# Patient Record
Sex: FEMALE | Race: BLACK OR AFRICAN AMERICAN | NOT HISPANIC OR LATINO | URBAN - METROPOLITAN AREA
[De-identification: names, ages, dates, MRNs, and addresses within clinical notes are randomized per-mention and may not be internally consistent; named-entity substitution may affect disease eponyms.]

---

## 2018-10-25 ENCOUNTER — EMERGENCY (EMERGENCY)
Facility: HOSPITAL | Age: 43
LOS: 0 days | Discharge: HOME | End: 2018-10-25
Admitting: PHYSICIAN ASSISTANT

## 2018-10-25 VITALS
SYSTOLIC BLOOD PRESSURE: 126 MMHG | TEMPERATURE: 97 F | RESPIRATION RATE: 18 BRPM | DIASTOLIC BLOOD PRESSURE: 71 MMHG | OXYGEN SATURATION: 99 % | HEART RATE: 77 BPM

## 2018-10-25 DIAGNOSIS — Z98.890 OTHER SPECIFIED POSTPROCEDURAL STATES: Chronic | ICD-10-CM

## 2018-10-25 DIAGNOSIS — M79.89 OTHER SPECIFIED SOFT TISSUE DISORDERS: ICD-10-CM

## 2018-10-25 DIAGNOSIS — Z90.49 ACQUIRED ABSENCE OF OTHER SPECIFIED PARTS OF DIGESTIVE TRACT: ICD-10-CM

## 2018-10-25 DIAGNOSIS — Z90.49 ACQUIRED ABSENCE OF OTHER SPECIFIED PARTS OF DIGESTIVE TRACT: Chronic | ICD-10-CM

## 2018-10-25 DIAGNOSIS — M79.671 PAIN IN RIGHT FOOT: ICD-10-CM

## 2018-10-25 DIAGNOSIS — Z98.890 OTHER SPECIFIED POSTPROCEDURAL STATES: ICD-10-CM

## 2018-10-25 DIAGNOSIS — Z88.0 ALLERGY STATUS TO PENICILLIN: ICD-10-CM

## 2018-10-25 NOTE — ED PROVIDER NOTE - OBJECTIVE STATEMENT
44 yo female with 44 yo female with no significant PMH presents to the ED c/o right sided heel pain that radiates up right lower extremity x 1 day. Patient states when she stood up in the morning after getting out of bed she noticed pain to pain to the bottom of her right heel that is worse with walking and baring weight.  At rest, patient describes the pain as a throbbing pain.  Patient denies any traumatic injury, fall, or stepping on anything.  Patient denies fever, chills, SOB, chest pain, puncture wounds, or back pain.

## 2018-10-25 NOTE — ED PROVIDER NOTE - PHYSICAL EXAMINATION
GENERAL: Well-nourished, Well-developed. NAD.  CVS: No reproducible chest wall tenderness. Normal S1,S2. No murmurs appreciated on auscultation   RESP: Chest rise symmetrical with good expansion. Lungs clear to auscultation B/L. No wheezing, rales, or rhonchi auscultated.  MSK: + swelling to right heel of foot.  + TTP to medial aspect of right heel and to the bottom of heel. No visible puncture wounds or bug bites. FROM of upper and lower extremities B/L. No visible signs of trauma, ecchymosis, or erythema  Skin: Warm, Dry. No rashes or lesions   EXT: Radial and pedal pulses present B/L. No calf tenderness or swelling B/L. No pedal edema B/L.  Neuro: AA&O x 3.  Sensation grossly intact. Strength 5/5 B/L. + Antalgic gait  Psych: Appropriate mood and affect

## 2018-10-25 NOTE — ED PROVIDER NOTE - NS ED ROS FT
"""On Restasis BID OU. Lubrication.  FSO 1 gm BID"" Constitutional: (-) fever (-) malaise (-) diaphoresis (-) chills   Cardiovascular: (-) chest pain, (-) syncope  Respiratory: (-) shortness of breath  Gastrointestinal: (-) N/V/D   Musculoskeletal: (+) right sided heel pain (-) neck pain (-) back pain  Integumentary: (-) rash  Neurological: (-) headache, (-) dizziness

## 2018-10-25 NOTE — ED PROVIDER NOTE - NSFOLLOWUPCLINICS_GEN_ALL_ED_FT
[facility]  Podiatry  .  [place]   Phone: [phone]  Fax:   Follow Up Time: 1-3 Days

## 2018-10-25 NOTE — ED PROVIDER NOTE - CARE PROVIDER_API CALL
Hermann Bell (DPM), Podiatric Medicine and Surgery  [address] [place] [id number]  Phone: [phone]  Fax: [phone]

## 2018-10-25 NOTE — ED PROCEDURE NOTE - CPROC ED POST PROC CARE GUIDE1
Verbal/written post procedure instructions were given to patient/caregiver./Elevate the injured extremity as instructed./Instructed patient/caregiver regarding signs and symptoms of infection./Instructed patient/caregiver to follow-up with primary care physician.

## 2019-04-01 ENCOUNTER — FORM ENCOUNTER (OUTPATIENT)
Age: 44
End: 2019-04-01

## 2019-05-15 ENCOUNTER — FORM ENCOUNTER (OUTPATIENT)
Age: 44
End: 2019-05-15

## 2019-05-19 ENCOUNTER — EMERGENCY (EMERGENCY)
Facility: HOSPITAL | Age: 44
LOS: 0 days | Discharge: HOME | End: 2019-05-19
Attending: EMERGENCY MEDICINE | Admitting: EMERGENCY MEDICINE
Payer: COMMERCIAL

## 2019-05-19 VITALS
DIASTOLIC BLOOD PRESSURE: 78 MMHG | SYSTOLIC BLOOD PRESSURE: 129 MMHG | RESPIRATION RATE: 18 BRPM | HEART RATE: 82 BPM | TEMPERATURE: 98 F | OXYGEN SATURATION: 99 %

## 2019-05-19 VITALS — WEIGHT: 156.09 LBS

## 2019-05-19 DIAGNOSIS — Z98.890 OTHER SPECIFIED POSTPROCEDURAL STATES: Chronic | ICD-10-CM

## 2019-05-19 DIAGNOSIS — R05 COUGH: ICD-10-CM

## 2019-05-19 DIAGNOSIS — Z90.49 ACQUIRED ABSENCE OF OTHER SPECIFIED PARTS OF DIGESTIVE TRACT: Chronic | ICD-10-CM

## 2019-05-19 PROCEDURE — 71046 X-RAY EXAM CHEST 2 VIEWS: CPT | Mod: 26

## 2019-05-19 PROCEDURE — 99283 EMERGENCY DEPT VISIT LOW MDM: CPT

## 2019-05-19 NOTE — ED PROVIDER NOTE - PROGRESS NOTE DETAILS
Chaperone ()  exam: normal external genitalia, Weiner in place no leakage around Weiner. Chaperone Nurse Jovan  exam: normal external genitalia, Weiner in place no leakage around Weiner.

## 2019-05-19 NOTE — ED PROVIDER NOTE - CARE PLAN
Principal Discharge DX:	Cough  Assessment and plan of treatment:	Plan: XR to rule out pneumonia. declines bladder US.

## 2019-05-19 NOTE — ED ADULT NURSE NOTE - OBJECTIVE STATEMENT
Patient is a 42yo female c/o coughing up yellow thick mucus and upper back pain when coughing after her hysterectomy on 05/16. Patient also noticed blood particles in her clements leg bag which was placed on 05/17 after her hysterectomy for urinary retention. (-) fever, abd pain, N/V. Surgery site clean and intact.

## 2019-05-19 NOTE — ED PROVIDER NOTE - OBJECTIVE STATEMENT
Attending Note: 44 y/o F PMH hypothyroidism, endometriosis s/p partial hysterectomy x 4 days p/w cough x2 days that illicits upper thoracic pain. Non productive. No pain at rest. No [place] or exertional angina. no calf pain or leg swelling. No SOB. No fevers or chills. Cough is non-productive. +congestion. No vaginal discharge or bleeding. Pt states that she has been experiencing some pain x1 day at the site of Weiner. Abd pain decreased since surgery. no nausea, vomiting, diarrhea, constipation.

## 2019-05-19 NOTE — ED PROVIDER NOTE - CLINICAL SUMMARY MEDICAL DECISION MAKING FREE TEXT BOX
Attending Note: 44 y/o F PMH hypothyroidism, endometriosis s/p partial hysterectomy x 4 days p/w upper thoracic pain with cough x2 days. Np pain at rest. No SOB. No fevers. No vaginal discharge or bleeding. Pt states that she has been experiencing some pain x1 day at the site of Weiner. PE: Midline incision intact, no discharge; appropriately tender; no CVAT. Plan: XR to rule out pneumonia. reaffirmed pt has no pain unless coughing. Pt ambulating without distress. Has OBGYN fu tomorrow. Patient was given strict return and follow up precautions. The patient has been informed of all concerning signs and symptoms to return to Emergency Department, the necessity to follow up with PMD/Clinic/follow up provided within 2-3 days was explained, and the patient reports understanding of above with capacity and insight.

## 2019-05-19 NOTE — ED ADULT TRIAGE NOTE - CHIEF COMPLAINT QUOTE
"I had a partial hysterectomy on Thursday.  Since yesterday Socorro been coughing up yellow thick mucus and have upper back pain when coughing.  I also have a Weiner leg bag and noticed blood particles inside"

## 2019-05-19 NOTE — ED PROVIDER NOTE - NSFOLLOWUPINSTRUCTIONS_ED_ALL_ED_FT
Follow up with your PMD within 48-72 hrs. Show copies of your reports given to you. Take all of your medications as previously prescribed. Return for any fever, worsening pain, shortness of breath, rash, new or worsening symptoms or any other concern.     Cough    Coughing is a reflex that clears your throat and your airways. Coughing helps to heal and protect your lungs. It is normal to cough occasionally, but a cough that happens with other symptoms or lasts a long time may be a sign of a condition that needs treatment. Coughing may be caused by infections, asthma or COPD, smoking, postnasal drip, gastroesophageal reflux, as well as other medical conditions. Take medicines only as instructed by your health care provider. Avoid environments or triggers that causes you to cough at work or at home.    SEEK IMMEDIATE MEDICAL CARE IF YOU HAVE ANY OF THE FOLLOWING SYMPTOMS: coughing up blood, shortness of breath, rapid heart rate, chest pain, unexplained weight loss or night sweats.

## 2019-05-19 NOTE — ED PROVIDER NOTE - PHYSICAL EXAMINATION
VITAL SIGNS: noted  CONSTITUTIONAL: Well-developed; well-nourished; in no acute distress  HEAD: Normocephalic; atraumatic  EYES: conjunctiva and sclera clear  ENT: No nasal discharge; airway clear. MMM  NECK: Supple; non tender. No anterior cervical lymphadenopathy noted  CARD: Regular rate and rhythm  RESP: CTAB/L, no wheezes, rales or rhonchi  ABD: Normal bowel sounds; soft; non-distended; Midline incision intact, no discharge; appropriately tender; no CVAT.   EXT: Normal ROM. No calf tenderness or edema. Distal pulses intact  NEURO: Alert, oriented. Grossly unremarkable. No focal deficits  SKIN: Skin exam is warm and dry, no acute rash  MS: No midline spinal tenderness

## 2019-05-19 NOTE — ED ADULT NURSE NOTE - CHPI ED NUR SYMPTOMS NEG
no chills/no decreased eating/drinking/no weakness/no vomiting/no fever/no tingling/no nausea/no dizziness

## 2020-07-22 NOTE — ED PROVIDER NOTE - NS ED NOTE AC HIGH RISK COUNTRIES
Chief Complaint   Patient presents with     Blood Draw     labs drawn via venipuncture by RN in lab     BP (!) 143/60 (BP Location: Left arm, Patient Position: Chair, Cuff Size: Adult Large)   Pulse 84   Temp 97.9  F (36.6  C) (Oral)   Resp 18   Wt 99.2 kg (218 lb 12.8 oz)   SpO2 96%   BMI 29.67 kg/m      Labs collected and sent from left antecubital venipuncture in lab by RN. Pt tolerated well.   Pt checked in for next appointment.    Candy Clark RN     No

## 2021-05-08 ENCOUNTER — INPATIENT (INPATIENT)
Facility: HOSPITAL | Age: 46
LOS: 0 days | Discharge: HOME | End: 2021-05-09
Attending: STUDENT IN AN ORGANIZED HEALTH CARE EDUCATION/TRAINING PROGRAM | Admitting: STUDENT IN AN ORGANIZED HEALTH CARE EDUCATION/TRAINING PROGRAM
Payer: MEDICAID

## 2021-05-08 VITALS
WEIGHT: 166.89 LBS | RESPIRATION RATE: 16 BRPM | TEMPERATURE: 98 F | SYSTOLIC BLOOD PRESSURE: 137 MMHG | DIASTOLIC BLOOD PRESSURE: 80 MMHG | OXYGEN SATURATION: 98 % | HEART RATE: 98 BPM

## 2021-05-08 DIAGNOSIS — Z90.49 ACQUIRED ABSENCE OF OTHER SPECIFIED PARTS OF DIGESTIVE TRACT: Chronic | ICD-10-CM

## 2021-05-08 DIAGNOSIS — Z98.890 OTHER SPECIFIED POSTPROCEDURAL STATES: Chronic | ICD-10-CM

## 2021-05-08 LAB
ALBUMIN SERPL ELPH-MCNC: 4 G/DL — SIGNIFICANT CHANGE UP (ref 3.5–5.2)
ALP SERPL-CCNC: 55 U/L — SIGNIFICANT CHANGE UP (ref 30–115)
ALT FLD-CCNC: 17 U/L — SIGNIFICANT CHANGE UP (ref 0–41)
ANION GAP SERPL CALC-SCNC: 13 MMOL/L — SIGNIFICANT CHANGE UP (ref 7–14)
APTT BLD: 23 SEC — CRITICAL LOW (ref 27–39.2)
AST SERPL-CCNC: 33 U/L — SIGNIFICANT CHANGE UP (ref 0–41)
BASOPHILS # BLD AUTO: 0.04 K/UL — SIGNIFICANT CHANGE UP (ref 0–0.2)
BASOPHILS NFR BLD AUTO: 0.4 % — SIGNIFICANT CHANGE UP (ref 0–1)
BILIRUB SERPL-MCNC: 0.3 MG/DL — SIGNIFICANT CHANGE UP (ref 0.2–1.2)
BLD GP AB SCN SERPL QL: SIGNIFICANT CHANGE UP
BUN SERPL-MCNC: 12 MG/DL — SIGNIFICANT CHANGE UP (ref 10–20)
CALCIUM SERPL-MCNC: 9.2 MG/DL — SIGNIFICANT CHANGE UP (ref 8.5–10.1)
CHLORIDE SERPL-SCNC: 102 MMOL/L — SIGNIFICANT CHANGE UP (ref 98–110)
CHOLEST SERPL-MCNC: 177 MG/DL — SIGNIFICANT CHANGE UP
CO2 SERPL-SCNC: 23 MMOL/L — SIGNIFICANT CHANGE UP (ref 17–32)
CREAT SERPL-MCNC: 0.8 MG/DL — SIGNIFICANT CHANGE UP (ref 0.7–1.5)
EOSINOPHIL # BLD AUTO: 0.07 K/UL — SIGNIFICANT CHANGE UP (ref 0–0.7)
EOSINOPHIL NFR BLD AUTO: 0.6 % — SIGNIFICANT CHANGE UP (ref 0–8)
GLUCOSE SERPL-MCNC: 100 MG/DL — HIGH (ref 70–99)
HCT VFR BLD CALC: 39.2 % — SIGNIFICANT CHANGE UP (ref 37–47)
HDLC SERPL-MCNC: 37 MG/DL — LOW
HGB BLD-MCNC: 13.1 G/DL — SIGNIFICANT CHANGE UP (ref 12–16)
IMM GRANULOCYTES NFR BLD AUTO: 0.4 % — HIGH (ref 0.1–0.3)
INR BLD: 0.96 RATIO — SIGNIFICANT CHANGE UP (ref 0.65–1.3)
LIPID PNL WITH DIRECT LDL SERPL: 125 MG/DL — HIGH
LYMPHOCYTES # BLD AUTO: 2.5 K/UL — SIGNIFICANT CHANGE UP (ref 1.2–3.4)
LYMPHOCYTES # BLD AUTO: 23 % — SIGNIFICANT CHANGE UP (ref 20.5–51.1)
MCHC RBC-ENTMCNC: 29.1 PG — SIGNIFICANT CHANGE UP (ref 27–31)
MCHC RBC-ENTMCNC: 33.4 G/DL — SIGNIFICANT CHANGE UP (ref 32–37)
MCV RBC AUTO: 87.1 FL — SIGNIFICANT CHANGE UP (ref 81–99)
MONOCYTES # BLD AUTO: 0.88 K/UL — HIGH (ref 0.1–0.6)
MONOCYTES NFR BLD AUTO: 8.1 % — SIGNIFICANT CHANGE UP (ref 1.7–9.3)
NEUTROPHILS # BLD AUTO: 7.34 K/UL — HIGH (ref 1.4–6.5)
NEUTROPHILS NFR BLD AUTO: 67.5 % — SIGNIFICANT CHANGE UP (ref 42.2–75.2)
NON HDL CHOLESTEROL: 140 MG/DL — HIGH
NRBC # BLD: 0 /100 WBCS — SIGNIFICANT CHANGE UP (ref 0–0)
PLATELET # BLD AUTO: 291 K/UL — SIGNIFICANT CHANGE UP (ref 130–400)
POTASSIUM SERPL-MCNC: 4.8 MMOL/L — SIGNIFICANT CHANGE UP (ref 3.5–5)
POTASSIUM SERPL-SCNC: 4.8 MMOL/L — SIGNIFICANT CHANGE UP (ref 3.5–5)
PROT SERPL-MCNC: 7.8 G/DL — SIGNIFICANT CHANGE UP (ref 6–8)
PROTHROM AB SERPL-ACNC: 11 SEC — SIGNIFICANT CHANGE UP (ref 9.95–12.87)
RBC # BLD: 4.5 M/UL — SIGNIFICANT CHANGE UP (ref 4.2–5.4)
RBC # FLD: 12.6 % — SIGNIFICANT CHANGE UP (ref 11.5–14.5)
SARS-COV-2 RNA SPEC QL NAA+PROBE: SIGNIFICANT CHANGE UP
SODIUM SERPL-SCNC: 138 MMOL/L — SIGNIFICANT CHANGE UP (ref 135–146)
TRIGL SERPL-MCNC: 149 MG/DL — SIGNIFICANT CHANGE UP
TROPONIN T SERPL-MCNC: <0.01 NG/ML — SIGNIFICANT CHANGE UP
TROPONIN T SERPL-MCNC: <0.01 NG/ML — SIGNIFICANT CHANGE UP
WBC # BLD: 10.87 K/UL — HIGH (ref 4.8–10.8)
WBC # FLD AUTO: 10.87 K/UL — HIGH (ref 4.8–10.8)

## 2021-05-08 PROCEDURE — 99223 1ST HOSP IP/OBS HIGH 75: CPT

## 2021-05-08 PROCEDURE — 99285 EMERGENCY DEPT VISIT HI MDM: CPT

## 2021-05-08 PROCEDURE — 70496 CT ANGIOGRAPHY HEAD: CPT | Mod: 26,MA

## 2021-05-08 PROCEDURE — 70450 CT HEAD/BRAIN W/O DYE: CPT | Mod: 26,MA,59

## 2021-05-08 PROCEDURE — 93010 ELECTROCARDIOGRAM REPORT: CPT

## 2021-05-08 PROCEDURE — 71045 X-RAY EXAM CHEST 1 VIEW: CPT | Mod: 26

## 2021-05-08 PROCEDURE — 70498 CT ANGIOGRAPHY NECK: CPT | Mod: 26,MA

## 2021-05-08 PROCEDURE — 99221 1ST HOSP IP/OBS SF/LOW 40: CPT

## 2021-05-08 PROCEDURE — 0042T: CPT

## 2021-05-08 RX ORDER — ENOXAPARIN SODIUM 100 MG/ML
40 INJECTION SUBCUTANEOUS DAILY
Refills: 0 | Status: DISCONTINUED | OUTPATIENT
Start: 2021-05-08 | End: 2021-05-09

## 2021-05-08 RX ORDER — ASPIRIN/CALCIUM CARB/MAGNESIUM 324 MG
324 TABLET ORAL ONCE
Refills: 0 | Status: COMPLETED | OUTPATIENT
Start: 2021-05-08 | End: 2021-05-08

## 2021-05-08 RX ORDER — HEPARIN SODIUM 5000 [USP'U]/ML
5000 INJECTION INTRAVENOUS; SUBCUTANEOUS EVERY 8 HOURS
Refills: 0 | Status: DISCONTINUED | OUTPATIENT
Start: 2021-05-08 | End: 2021-05-08

## 2021-05-08 RX ADMIN — Medication 324 MILLIGRAM(S): at 04:59

## 2021-05-08 NOTE — H&P ADULT - NSICDXPASTSURGICALHX_GEN_ALL_CORE_FT
PAST SURGICAL HISTORY:  H/O carpal tunnel repair b/l    History of appendectomy     History of hysteroscopy

## 2021-05-08 NOTE — H&P ADULT - NSICDXFAMILYHX_GEN_ALL_CORE_FT
FAMILY HISTORY:  Father  Still living? Unknown  FH: hypertension, Age at diagnosis: Age Unknown    Mother  Still living? Unknown  FH: hypertension, Age at diagnosis: Age Unknown  FH: thyroid disease, Age at diagnosis: Age Unknown

## 2021-05-08 NOTE — ED ADULT NURSE NOTE - CHIEF COMPLAINT QUOTE
pt BIBA for R sided numbness x 40 mins ago. pt stated "just the R side of my face is numb" Stroke code activated, MD made aware

## 2021-05-08 NOTE — ED ADULT TRIAGE NOTE - CHIEF COMPLAINT QUOTE
pt BIBA for R sided numbness x 40 mins ago. pt stated "just the R side of my face is numb" pt BIBA for R sided numbness x 40 mins ago. pt stated "just the R side of my face is numb" Stroke code activated, MD made aware

## 2021-05-08 NOTE — ED PROVIDER NOTE - PROGRESS NOTE DETAILS
[facility] Tele Stroke Dr Qureshi cancelled stroke code, patient is not TPA candidate Dr Qureshi recommend admission to tele, no stroke unit or ICU

## 2021-05-08 NOTE — H&P ADULT - ATTENDING COMMENTS
pt still has facial numbness  cont tele  fu mri     #Progress Note Handoff  Pending (specify):  MRI, neuro fu , tele 24hr  Family discussion: dw pt, fully aaox3 understands and agrees with plan   Disposition: Home_

## 2021-05-08 NOTE — H&P ADULT - ASSESSMENT
45 year old F with PMHx of Graves disease (off medications due to remission) presenting with facial numbness.            45 year old F with PMHx of Graves disease (off medications due to remission) presenting with facial numbness.           -N/c mri brain  -ECHO   -lipid profile and hbaic, tsh    #) Graves Disease  -off medications  -follows with Dr. Jarrett  -CT with diffusely enlarged thyroid gland, will obtain thyroid ultrasound.     DVT ppx:  Heparin     GI ppx: Not indicated    Diet: Regular     Activity: Increase as tolerated    CHG WASH    Dispo:  Tele  45 year old F with PMHx of Graves disease (off medications due to remission) presenting with facial numbness.    #) Right facial numbness/hyposthesia   -Symptoms transient, have since resolved   -DDx: [place] Palsy, MS, TIA, r/o CVA  -Check MRI brain nc  -Check ECHO   -Lipid profile and HbA1c  -Neuro follow up     #) Graves Disease  -off medications, currently with sinus tachycardia on EKG   -follows with Dr. Jarrett  -CT with diffusely enlarged thyroid gland, will obtain thyroid ultrasound   -Check TSH, FT4, T3    DVT ppx:  Heparin     GI ppx: Not indicated    Diet: Regular     Activity: Increase as tolerated    CHG WASH    Dispo:  Tele for now

## 2021-05-08 NOTE — H&P ADULT - NSHPPHYSICALEXAM_GEN_ALL_CORE
GENERAL: NAD, well-developed  PSYCH: AAOx3  HEENT:  Atraumatic, Normocephalic. EOMI, PERRLA, conjunctiva clear, sclera white, No JVD  PULMONARY: Clear to auscultation bilaterally; No wheeze  CARDIOVASCULAR: Regular rate and rhythm; No murmurs, rubs, or gallops  GASTROINTESTINAL: Soft, Nontender, Nondistended; Bowel sounds present  MUSCULOSKELETAL:  2+ Peripheral Pulses, No clubbing, cyanosis, or edema  NEUROLOGY: non-focal  SKIN: No rashes or lesions

## 2021-05-08 NOTE — CONSULT NOTE ADULT - ASSESSMENT
Impression:  44 yo right handed female with pmhx of graves disease not on medication presented after she woke up with right facial numbness. Patient states that she went to sleep at around 11pm and then woke up around 12mn with facial sensory symptoms. Patient denies focal weakness headache. CTH negative for acute findings. Patient not a tpa candidate currently since her symptoms are mild sensory deficit on the right face. Ed team discussed with ctc stroke attending     Relevant PMH:  -Follow up pending official read of CTAH/N  -N/c mri brain   -lipid profile and hbaic, tsh  -Echo      Disposition: TIA OBS Unit     Impression:  44 yo right handed female with pmhx of graves disease not on medication presented after she woke up with right facial numbness. Patient states that she went to sleep at around 11pm and then woke up around 12mn with facial sensory symptoms. Patient denies focal weakness headache. CTH negative for acute findings. Patient not a tpa candidate currently since her symptoms are mild sensory deficit on the right face. CTA H/N /P was negative for LVO and perfusion deficits. Ed team discussed with ctc stroke attending     Relevant PMH:  -N/c mri brain  -ECHO   -lipid profile and hbaic, tsh    Disposition: telemetry

## 2021-05-08 NOTE — H&P ADULT - NSHPSOCIALHISTORY_GEN_ALL_CORE
Marital Status:  (  x )    (   ) Single    (   )    (  )   Lives with: (  ) alone  (x  ) children   ( x ) spouse   (  ) parents  (  ) other  Recent Travel: No recent travel  Occupation:  LNP at [employer]     Substance Use (street drugs): ( x ) never used  (  ) other:  Tobacco Usage:  ( x  ) never smoked   (   ) former smoker   (   ) current smoker  (     ) pack year  Alcohol Usage: None

## 2021-05-08 NOTE — H&P ADULT - HISTORY OF PRESENT ILLNESS
45 year old F with PMHx of Graves disease (off medications due to remission) presenting with facial numbness.  Patient states that she went to sleep at around 11pm and then woke up around midnight with facial sensory symptoms. She states she felt a heaviness and a decrease in sensation on the entire right side of the face. Patient denies any weakness or headache, but did not look in the mirror to assess deficits. In the ED stroke cod was called, NIHSS:1.  CTH negative for acute findings. Patient was not a tpa candidate as since her symptoms are mild sensory deficit on the right face. CTA H/N /P was negative for LVO and perfusion deficits. She was admitted to telemetry for monitoring.     T(C): 36.7 (05-08-21 @ 06:41), Max: 36.9 (05-08-21 @ 00:46)  HR: 95 (05-08-21 @ 06:41) (95 - 100)  BP: 113/69 (05-08-21 @ 06:41) (113/69 - 137/80)  RR: 18 (05-08-21 @ 06:41) (16 - 18)  SpO2: 97% (05-08-21 @ 05:01) (97% - 98%)

## 2021-05-08 NOTE — H&P ADULT - NSHPLABSRESULTS_GEN_ALL_CORE
05-08    138  |  102  |  12  ----------------------------<  100<H>  4.8   |  23  |  0.8    Ca    9.2      08 May 2021 01:06    TPro  7.8  /  Alb  4.0  /  TBili  0.3  /  DBili  x   /  AST  33  /  ALT  17  /  AlkPhos  55  05-08          PT/INR - ( 08 May 2021 01:06 )   PT: 11.00 sec;   INR: 0.96 ratio         PTT - ( 08 May 2021 01:06 )  PTT:23.0 sec    Lactate Trend      CARDIAC MARKERS ( 08 May 2021 01:06 )  x     / <0.01 ng/mL / x     / x     / x        CAPILLARY BLOOD GLUCOSE  100 (08 May 2021 03:15)      EXAM:  CT PERFUSION W MAPS IC        EXAM:  CT ANGIO NECK (W)AW IC        EXAM:  CT ANGIO BRAIN (W)AW IC            PROCEDURE DATE:  05/08/2021        IMPRESSION:    1. No evidence of focal perfusion deficit to suggest acute cerebral ischemia.    2. No CTA evidence of major vascular stenoses or occlusions.    3. Thyroid gland appears diffusely enlarged, correlation with thyroid ultrasound is recommended.      XR CHEST PORTABLE URGENT 1V            PROCEDURE DATE:  05/08/2021        Impression:    No radiographic evidence of acute cardiopulmonary disease. 05-08    138  |  102  |  12  ----------------------------<  100<H>  4.8   |  23  |  0.8    Ca    9.2      08 May 2021 01:06    TPro  7.8  /  Alb  4.0  /  TBili  0.3  /  DBili  x   /  AST  33  /  ALT  17  /  AlkPhos  55  05-08          PT/INR - ( 08 May 2021 01:06 )   PT: 11.00 sec;   INR: 0.96 ratio         PTT - ( 08 May 2021 01:06 )  PTT:23.0 sec      CARDIAC MARKERS ( 08 May 2021 01:06 )  x     / <0.01 ng/mL / x     / x     / x        CAPILLARY BLOOD GLUCOSE  100 (08 May 2021 03:15)      EXAM:  CT PERFUSION W MAPS IC        EXAM:  CT ANGIO NECK (W)AW IC        EXAM:  CT ANGIO BRAIN (W)AW IC            PROCEDURE DATE:  05/08/2021        IMPRESSION:    1. No evidence of focal perfusion deficit to suggest acute cerebral ischemia.    2. No CTA evidence of major vascular stenoses or occlusions.    3. Thyroid gland appears diffusely enlarged, correlation with thyroid ultrasound is recommended.    XR CHEST PORTABLE URGENT 1V          PROCEDURE DATE:  05/08/2021      Impression:    No radiographic evidence of acute cardiopulmonary disease.

## 2021-05-08 NOTE — CONSULT NOTE ADULT - SUBJECTIVE AND OBJECTIVE BOX
Chief Complaint: Right facial numbness    HPI:  46 yo right handed female with pmhx of graves disease not on medication presented after she woke up with right facial numbness. Patient states that she went to sleep at around 11pm and then woke up around 12mn with facial sensory symptoms. Patient denies focal weakness headache     Relevant PMH:  [] Prior ischemic stroke/TIA  [] Afib  []CAD  []HTN  []DLD  []DM []PVD []Obesity [] Sedentary lifestyle []CHF  []MIREILLE  []Cancer Hx     Social History: [] Smoking []  Drug Use: []   Alcohol Use:   [] Other:  denies        Possible Location of Stroke:  Unknown at this time, will have a better understanding post stroke workup.        Possible Cause of Stroke:  Unknown at this time, will have a better understanding post stroke workup.          Relevant Cerebral Imaging:  < from: CT Brain Stroke Protocol (05.08.21 @ 01:20) >  FINDINGS:    VENTRICULAR SYSTEM: Age-appropriate.    BRAIN PARENCHYMA: Unremarkable.    ASPECT SCORE: 10.    INTRACRANIAL HEMORRHAGE: None.    MASS EFFECT/MIDLINE SHIFT: None.    PARANASAL SINUSES AND MASTOID AIR CELLS: Unremarkable.    OSSEOUS STRUCTURES: Unremarkable.      IMPRESSION:    No mass effect or intracranial hemorrhage. No CT evidence of acute large territorial infarction.      Dr. Getachew Swartz discussedpreliminary findings with PURVI WETZEL NP [id number] on 5/8/2021 1:24 AM with readback.    GETACHEW SWARTZ M.D., RESIDENT RADIOLOGIST  This document has been electronically signed.  CHARLENE LORENZO MD; Attending Radiologist  This document has been electronically signed. May  8 2021  1:56AM    < end of copied text >    Relevant Cervicocerebral Imaging:          Relevant blood tests:  COVID-19 PCR (05.08.21 @ 01:06)   COVID-19 PCR: NotDetec: You can help in the fight against COVID-19. [facility] may contact     Relevant cardiac rhythm monitoring: Pending      Relevant Cardiac Structure:(TTE/ANANDA +/-):[]No intracardiac thrombus/[] no vegetation/[]no akynesia/EF: Pending    Home Medications:  Not on any routine medications at home      MEDICATIONS  (STANDING):          Exam:  patient a/o x3   cn: Intact except for right facial sensory loss in the [id number] v2 division  power: 5/5 throughout/ no drift  FTN: No dsmetria  HKS: No limb ataxia  Gait: deferred  No neglect    Vital Signs Last 24 Hrs  T(C): 36.9 (08 May 2021 00:46), Max: 36.9 (08 May 2021 00:46)  T(F): 98.4 (08 May 2021 00:46), Max: 98.4 (08 May 2021 00:46)  HR: 98 (08 May 2021 00:46) (98 - 98)  BP: 137/80 (08 May 2021 00:46) (137/80 - 137/80)  BP(mean): --  RR: 16 (08 May 2021 00:46) (16 - 16)  SpO2: 98% (08 May 2021 00:46) (98% - 98%)      NIHSS  LOC:       1a: 0    1b(Questions):  0         1c(Instructions):  0           Best Gaze:0  Visual:0  Motor:                 RUE:  0   RLE:  0   LUE: 0    LLE: 0    FACE:  0   Limb Ataxia: 0  Sensory:     1  Language:    0   Dysarthria:   0       Extinction and Inattention: 0    NIHSS on admission: 1                        m-RS: (0 at baseline and current)  0 No symptoms at all  1 No significant disability despite symptoms; able to carry out all usual duties and activities without assistance  2 Slight disability; unable to carry out all previous activities, but able to look after own affairs  3 Moderate disability; requiring some help, but able to walk without assistance  4 Moderately severe disability; unable to walk without assistance and unable to attend to own bodily needs without assistance  5 Severe disability; bedridden, incontinent and requiring constant nursing care and attention  6 Dead   Chief Complaint: Right facial numbness    HPI:  44 yo right handed female with pmhx of graves disease not on medication presented after she woke up with right facial numbness. Patient states that she went to sleep at around 11pm and then woke up around 12mn with facial sensory symptoms. Patient denies focal weakness headache     Relevant PMH:  [] Prior ischemic stroke/TIA  [] Afib  []CAD  []HTN  []DLD  []DM []PVD []Obesity [] Sedentary lifestyle []CHF  []MIREILLE  []Cancer Hx     Social History: [] Smoking []  Drug Use: []   Alcohol Use:   [] Other:  denies        Possible Location of Stroke:  Unknown at this time, will have a better understanding post stroke workup.        Possible Cause of Stroke:  Unknown at this time, will have a better understanding post stroke workup.          Relevant Cerebral Imaging:  < from: CT Brain Stroke Protocol (05.08.21 @ 01:20) >  FINDINGS:    VENTRICULAR SYSTEM: Age-appropriate.    BRAIN PARENCHYMA: Unremarkable.    ASPECT SCORE: 10.    INTRACRANIAL HEMORRHAGE: None.    MASS EFFECT/MIDLINE SHIFT: None.    PARANASAL SINUSES AND MASTOID AIR CELLS: Unremarkable.    OSSEOUS STRUCTURES: Unremarkable.      IMPRESSION:    No mass effect or intracranial hemorrhage. No CT evidence of acute large territorial infarction.      Dr. Getachew Swartz discussedpreliminary findings with PURVI WETZEL NP [id number] on 5/8/2021 1:24 AM with readback.    GETACHEW SWARTZ M.D., RESIDENT RADIOLOGIST  This document has been electronically signed.  CHARLENE LORENZO MD; Attending Radiologist  This document has been electronically signed. May  8 2021  1:56AM    < end of copied text >    Relevant Cervicocerebral Imaging:  < from: CT Angio Neck w/ IV Cont (05.08.21 @ 01:41) >  FINDINGS:    CT PERFUSION:    There is no evidence of focal perfusion deficit to suggest acute cerebral ischemia.    CTA NECK:    Normal origins of the innominate, left common carotid and left subclavian artery off the aortic arch.    Normal contrast filling the bilateral common carotid arteries with normal carotid bifurcations.    CTA BRAIN:    Normal contrast filling of the middle cerebral arteries and distal MCA branches.    Normal contrast filling of the bilateral anterior cerebral arteries.    Normal contrast filling of the basilar artery, bilateralSCA and PCA vessels.    Normal contrast filling of the bilateral vertebral arteries with co dominance.      IMPRESSION:    No evidence of focal perfusion deficit to suggest acute cerebral ischemia.    No CTA evidence of major vascular stenoses or occlusions.          ******PRELIMINARY REPORT******    ******PRELIMINARY REPORT******          GETACHEW SWARTZ M.D., RESIDENT RADIOLOGIST    < end of copied text >          Relevant blood tests:  COVID-19 PCR (05.08.21 @ 01:06)   COVID-19 PCR: NotDete: You can help in the fight against COVID-19. [facility] may contact     Relevant cardiac rhythm monitoring: Pending      Relevant Cardiac Structure:(TTE/ANANDA +/-):[]No intracardiac thrombus/[] no vegetation/[]no akynesia/EF: Pending    Home Medications:  Not on any routine medications at home      MEDICATIONS  (STANDING):          Exam:  patient a/o x3   cn: Intact except for right facial sensory loss in the [id number] v2 division  power: 5/5 throughout/ no drift  FTN: No dsmetria  HKS: No limb ataxia  Gait: deferred  No neglect    Vital Signs Last 24 Hrs  T(C): 36.9 (08 May 2021 00:46), Max: 36.9 (08 May 2021 00:46)  T(F): 98.4 (08 May 2021 00:46), Max: 98.4 (08 May 2021 00:46)  HR: 98 (08 May 2021 00:46) (98 - 98)  BP: 137/80 (08 May 2021 00:46) (137/80 - 137/80)  BP(mean): --  RR: 16 (08 May 2021 00:46) (16 - 16)  SpO2: 98% (08 May 2021 00:46) (98% - 98%)      NIHSS  LOC:       1a: 0    1b(Questions):  0         1c(Instructions):  0           Best Gaze:0  Visual:0  Motor:                 RUE:  0   RLE:  0   LUE: 0    LLE: 0    FACE:  0   Limb Ataxia: 0  Sensory:     1  Language:    0   Dysarthria:   0       Extinction and Inattention: 0    NIHSS on admission: 1                        m-RS: (0 at baseline and current)  0 No symptoms at all  1 No significant disability despite symptoms; able to carry out all usual duties and activities without assistance  2 Slight disability; unable to carry out all previous activities, but able to look after own affairs  3 Moderate disability; requiring some help, but able to walk without assistance  4 Moderately severe disability; unable to walk without assistance and unable to attend to own bodily needs without assistance  5 Severe disability; bedridden, incontinent and requiring constant nursing care and attention  6 Dead

## 2021-05-08 NOTE — CONSULT NOTE ADULT - ATTENDING COMMENTS
I have personally seen and examined this patient on 5/8. I have fully participated in the care of this patient.  I have reviewed all pertinent clinical information, including history, physical exam, plan and note. Patient presented with right face and right shoulder numbness after sleeping to the left side. Exam today shows normal strength. Still has reduced sensation in the right side of face. Less likely CVA. Awaiting for Alex MRI.   I have reviewed all pertinent clinical information and reviewed all relevant imaging and diagnostic studies personally.  Recommendations as above.  Agree with above assessment except as noted.

## 2021-05-08 NOTE — ED PROVIDER NOTE - OBJECTIVE STATEMENT
44 yo female hx of hyperthyroidism (no med needed now) present c/o right sided facial numbness started 30 min PTA. Patient woke up with the sensation to her face and she went to bed around 11pm. also reported tingling sensation down her right arm. Denies HA/Dizziness/slur speech/extremities weakness and numbness/ facial weakness. Denies Fever/chill/recent illness/coughing/chest pain/sob/abd pain/n/v/d/extremities pain/urinary sxs. Denies SI/HI/Hallucinations

## 2021-05-08 NOTE — SWALLOW BEDSIDE ASSESSMENT ADULT - SLP PERTINENT HISTORY OF CURRENT PROBLEM
44 yo female admitted to ED with c/o right facial numbness. PMHx of graves disease not on medication. CTH negative for acute findings. Patient not a tpa candidate since symptoms are mild sensory deficit on the right face. CTA H/N /P negative for LVO and perfusion deficits. Pt. is awaiting for MRI head study.

## 2021-05-08 NOTE — ED PROVIDER NOTE - PHYSICAL EXAMINATION
CONSTITUTIONAL: Well-appearing; well-nourished; in no apparent distress.   EYES: PERRL; EOM intact.   ENT: normal nose; no rhinorrhea; normal pharynx with no tonsillar hypertrophy.   NECK: Supple; non-tender; no cervical lymphadenopathy. No JVD.   CARDIOVASCULAR: Normal S1, S2; no murmurs, rubs, or gallops.   RESPIRATORY: Normal chest excursion with respiration; breath sounds clear and equal bilaterally; no wheezes, rhonchi, or rales.  GI/: Normal bowel sounds; non-distended; non-tender; no palpable organomegaly.   MS: No evidence of trauma or deformity. Non-tender to palpation. No scoliosis. No CVA tenderness. Normal ROM in all four extremities; non-tender to palpation; distal pulses are normal.   SKIN: Normal for age and race; warm; dry; good turgor; no apparent lesions or exudate.   NEURO/PSYCH: A & O x 4; CN II- IV and VI-XII grossly unremarkable. subjective decreased sensation of right face. no drifting. strength equal to b/l upper and lower extremities. speaking coherently. nml cerebellum test. ambulate with nml and steady gait NIHSS - 1

## 2021-05-08 NOTE — ED PROVIDER NOTE - CLINICAL SUMMARY MEDICAL DECISION MAKING FREE TEXT BOX
pt evaluated for RUE paresthesias and facial numbness, stroke code called in ED. pt evaluated by neuro who recommended admission for further workup and pt agreed. pt admitted to telemetry.

## 2021-05-09 ENCOUNTER — TRANSCRIPTION ENCOUNTER (OUTPATIENT)
Age: 46
End: 2021-05-09

## 2021-05-09 VITALS
TEMPERATURE: 98 F | HEART RATE: 86 BPM | RESPIRATION RATE: 19 BRPM | DIASTOLIC BLOOD PRESSURE: 77 MMHG | SYSTOLIC BLOOD PRESSURE: 116 MMHG

## 2021-05-09 LAB
ALBUMIN SERPL ELPH-MCNC: 3.8 G/DL — SIGNIFICANT CHANGE UP (ref 3.5–5.2)
ALP SERPL-CCNC: 59 U/L — SIGNIFICANT CHANGE UP (ref 30–115)
ALT FLD-CCNC: 13 U/L — SIGNIFICANT CHANGE UP (ref 0–41)
ANION GAP SERPL CALC-SCNC: 11 MMOL/L — SIGNIFICANT CHANGE UP (ref 7–14)
APTT BLD: 30.1 SEC — SIGNIFICANT CHANGE UP (ref 27–39.2)
AST SERPL-CCNC: 15 U/L — SIGNIFICANT CHANGE UP (ref 0–41)
BASOPHILS # BLD AUTO: 0.04 K/UL — SIGNIFICANT CHANGE UP (ref 0–0.2)
BASOPHILS NFR BLD AUTO: 0.5 % — SIGNIFICANT CHANGE UP (ref 0–1)
BILIRUB SERPL-MCNC: 0.5 MG/DL — SIGNIFICANT CHANGE UP (ref 0.2–1.2)
BUN SERPL-MCNC: 14 MG/DL — SIGNIFICANT CHANGE UP (ref 10–20)
CALCIUM SERPL-MCNC: 9.2 MG/DL — SIGNIFICANT CHANGE UP (ref 8.5–10.1)
CHLORIDE SERPL-SCNC: 106 MMOL/L — SIGNIFICANT CHANGE UP (ref 98–110)
CHOLEST SERPL-MCNC: 176 MG/DL — SIGNIFICANT CHANGE UP
CO2 SERPL-SCNC: 24 MMOL/L — SIGNIFICANT CHANGE UP (ref 17–32)
CREAT SERPL-MCNC: 0.7 MG/DL — SIGNIFICANT CHANGE UP (ref 0.7–1.5)
EOSINOPHIL # BLD AUTO: 0.22 K/UL — SIGNIFICANT CHANGE UP (ref 0–0.7)
EOSINOPHIL NFR BLD AUTO: 2.8 % — SIGNIFICANT CHANGE UP (ref 0–8)
GLUCOSE SERPL-MCNC: 91 MG/DL — SIGNIFICANT CHANGE UP (ref 70–99)
HCT VFR BLD CALC: 38.5 % — SIGNIFICANT CHANGE UP (ref 37–47)
HDLC SERPL-MCNC: 37 MG/DL — LOW
HGB BLD-MCNC: 12.7 G/DL — SIGNIFICANT CHANGE UP (ref 12–16)
IMM GRANULOCYTES NFR BLD AUTO: 0.4 % — HIGH (ref 0.1–0.3)
INR BLD: 1.01 RATIO — SIGNIFICANT CHANGE UP (ref 0.65–1.3)
LIPID PNL WITH DIRECT LDL SERPL: 126 MG/DL — HIGH
LYMPHOCYTES # BLD AUTO: 2.79 K/UL — SIGNIFICANT CHANGE UP (ref 1.2–3.4)
LYMPHOCYTES # BLD AUTO: 35.1 % — SIGNIFICANT CHANGE UP (ref 20.5–51.1)
MAGNESIUM SERPL-MCNC: 1.8 MG/DL — SIGNIFICANT CHANGE UP (ref 1.8–2.4)
MCHC RBC-ENTMCNC: 28.6 PG — SIGNIFICANT CHANGE UP (ref 27–31)
MCHC RBC-ENTMCNC: 33 G/DL — SIGNIFICANT CHANGE UP (ref 32–37)
MCV RBC AUTO: 86.7 FL — SIGNIFICANT CHANGE UP (ref 81–99)
MONOCYTES # BLD AUTO: 0.88 K/UL — HIGH (ref 0.1–0.6)
MONOCYTES NFR BLD AUTO: 11.1 % — HIGH (ref 1.7–9.3)
NEUTROPHILS # BLD AUTO: 3.98 K/UL — SIGNIFICANT CHANGE UP (ref 1.4–6.5)
NEUTROPHILS NFR BLD AUTO: 50.1 % — SIGNIFICANT CHANGE UP (ref 42.2–75.2)
NON HDL CHOLESTEROL: 139 MG/DL — HIGH
NRBC # BLD: 0 /100 WBCS — SIGNIFICANT CHANGE UP (ref 0–0)
PHOSPHATE SERPL-MCNC: 4 MG/DL — SIGNIFICANT CHANGE UP (ref 2.1–4.9)
PLATELET # BLD AUTO: 261 K/UL — SIGNIFICANT CHANGE UP (ref 130–400)
POTASSIUM SERPL-MCNC: 4.1 MMOL/L — SIGNIFICANT CHANGE UP (ref 3.5–5)
POTASSIUM SERPL-SCNC: 4.1 MMOL/L — SIGNIFICANT CHANGE UP (ref 3.5–5)
PROT SERPL-MCNC: 6.7 G/DL — SIGNIFICANT CHANGE UP (ref 6–8)
PROTHROM AB SERPL-ACNC: 11.6 SEC — SIGNIFICANT CHANGE UP (ref 9.95–12.87)
RBC # BLD: 4.44 M/UL — SIGNIFICANT CHANGE UP (ref 4.2–5.4)
RBC # FLD: 12.8 % — SIGNIFICANT CHANGE UP (ref 11.5–14.5)
SODIUM SERPL-SCNC: 141 MMOL/L — SIGNIFICANT CHANGE UP (ref 135–146)
T3 SERPL-MCNC: 114 NG/DL — SIGNIFICANT CHANGE UP (ref 80–200)
T4 AB SER-ACNC: 9 UG/DL — SIGNIFICANT CHANGE UP (ref 4.6–12)
TRIGL SERPL-MCNC: 124 MG/DL — SIGNIFICANT CHANGE UP
TSH SERPL-MCNC: 0.58 UIU/ML — SIGNIFICANT CHANGE UP (ref 0.27–4.2)
WBC # BLD: 7.94 K/UL — SIGNIFICANT CHANGE UP (ref 4.8–10.8)
WBC # FLD AUTO: 7.94 K/UL — SIGNIFICANT CHANGE UP (ref 4.8–10.8)

## 2021-05-09 PROCEDURE — 99238 HOSP IP/OBS DSCHRG MGMT 30/<: CPT

## 2021-05-09 PROCEDURE — 76536 US EXAM OF HEAD AND NECK: CPT | Mod: 26

## 2021-05-09 PROCEDURE — 70551 MRI BRAIN STEM W/O DYE: CPT | Mod: 26

## 2021-05-09 PROCEDURE — 93306 TTE W/DOPPLER COMPLETE: CPT | Mod: 26

## 2021-05-09 NOTE — DISCHARGE NOTE PROVIDER - NSDCCPCAREPLAN_GEN_ALL_CORE_FT
PRINCIPAL DISCHARGE DIAGNOSIS  Diagnosis: Facial numbness  Assessment and Plan of Treatment: ruled out neurologic issues

## 2021-05-09 NOTE — DISCHARGE NOTE PROVIDER - HOSPITAL COURSE
HPI:  45 year old F with PMHx of Graves disease (off medications due to remission) presenting with facial numbness.  Patient states that she went to sleep at around 11pm and then woke up around midnight with facial sensory symptoms. She states she felt a heaviness and a decrease in sensation on the entire right side of the face. Patient denies any weakness or headache, but did not look in the mirror to assess deficits. In the ED stroke cod was called, NIHSS:1.  CTH negative for acute findings. Patient was not a tpa candidate as since her symptoms are mild sensory deficit on the right face. CTA H/N /P was negative for LVO and perfusion deficits. She was admitted to telemetry for monitoring.     T(C): 36.7 (05-08-21 @ 06:41), Max: 36.9 (05-08-21 @ 00:46)  HR: 95 (05-08-21 @ 06:41) (95 - 100)  BP: 113/69 (05-08-21 @ 06:41) (113/69 - 137/80)  RR: 18 (05-08-21 @ 06:41) (16 - 18)  SpO2: 97% (05-08-21 @ 05:01) (97% - 98%)     (08 May 2021 09:32)    < from: TTE Echo Complete w/o Contrast w/ Doppler (05.09.21 @ 09:53) >    1. Normal global left ventricular systolic function.   2. LV Ejection Fraction by Garcia's Method with a biplane EF of 58 %.   3. Normal left ventricular internal cavity size.   4. The mean global longitudinal peak strain by speckle tracking is -14.8% which is reduced.   5. Normal left atrial size.   6. Normal right atrial size.   7. No evidence of mitral valve regurgitation.   8. LA volume Index is 17.0 ml/m² ml/m2.    < end of copied text >

## 2021-05-09 NOTE — DISCHARGE NOTE NURSING/CASE MANAGEMENT/SOCIAL WORK - PATIENT PORTAL LINK FT
You can access the FollowMyHealth Patient Portal offered by [facility] by registering at the following website: [email or web address]. By joining [employer]’s FollowMyHealth portal, you will also be able to view your health information using other applications (apps) compatible with our system.

## 2021-05-09 NOTE — PROGRESS NOTE ADULT - ASSESSMENT
HPI:  45 year old F with PMHx of Graves disease (off medications due to remission) presenting with facial numbness.  Patient states that she went to sleep at around 11pm and then woke up around midnight with facial sensory symptoms. She states she felt a heaviness and a decrease in sensation on the entire right side of the face. Patient denies any weakness or headache, but did not look in the mirror to assess deficits. In the ED stroke cod was called, NIHSS:1.  CTH negative for acute findings. Patient was not a tpa candidate as since her symptoms are mild sensory deficit on the right face. CTA H/N /P was negative for LVO and perfusion deficits. She was admitted to telemetry for monitoring.     #Facial numbness rule out neurologic etiology (CVA?)  resolved  MRI performed awaiting report -> spoke to logistics to expedite     #Graves disease in remission    #Overweight BMI 28 patient needs to see dieitian outpatient for further evaluation     PROGRESS NOTE HANDOFF    Pending: MRI report , if  neg dc home     Family discussion: patient verbalized understanding and agreeable to plan of care     Disposition:  home

## 2021-05-09 NOTE — PROGRESS NOTE ADULT - SUBJECTIVE AND OBJECTIVE BOX
JOSE R PERKINS  45y  [id number]      Patient is a 45y old  Female who presents with a chief complaint of Loss of sensation (08 May 2021 09:32)      INTERVAL HPI/OVERNIGHT EVENTS:    no acute overnight     REVIEW OF SYSTEMS:  CONSTITUTIONAL: No fever, weight loss, or fatigue  EYES: No eye pain, visual disturbances, or discharge  ENMT:  No difficulty hearing, tinnitus, vertigo; No sinus or throat pain  NECK: No pain or stiffness  BREASTS: No pain, masses, or nipple discharge  RESPIRATORY: No cough, wheezing, chills or hemoptysis; No shortness of breath  CARDIOVASCULAR: No chest pain, palpitations, dizziness, or leg swelling  GASTROINTESTINAL: No abdominal or epigastric pain. No nausea, vomiting, or hematemesis; No diarrhea or constipation. No melena or hematochezia.  GENITOURINARY: No dysuria, frequency, hematuria, or incontinence  NEUROLOGICAL: No headaches, memory loss, loss of strength, numbness, or tremors  SKIN: No itching, burning, rashes, or lesions   LYMPH NODES: No enlarged glands  ENDOCRINE: No heat or cold intolerance; No hair loss  MUSCULOSKELETAL: No joint pain or swelling; No muscle, back, or extremity pain  PSYCHIATRIC: No depression, anxiety, mood swings, or difficulty sleeping  HEME/LYMPH: No easy bruising, or bleeding gums  ALLERY AND IMMUNOLOGIC: No hives or eczema  FAMILY HISTORY:  FH: thyroid disease (Mother)    FH: hypertension (Mother, Father)      T(C): 36.4 (05-09-21 @ 12:40), Max: 37.1 (05-08-21 @ 21:00)  HR: 86 (05-09-21 @ 12:40) (80 - 90)  BP: 116/77 (05-09-21 @ 12:40) (91/52 - 133/63)  RR: 19 (05-09-21 @ 12:40) (18 - 19)  SpO2: 99% (05-08-21 @ 20:39) (99% - 99%)  Wt(kg): --Vital Signs Last 24 Hrs  T(C): 36.4 (09 May 2021 12:40), Max: 37.1 (08 May 2021 21:00)  T(F): 97.5 (09 May 2021 12:40), Max: 98.8 (08 May 2021 21:00)  HR: 86 (09 May 2021 12:40) (80 - 90)  BP: 116/77 (09 May 2021 12:40) (91/52 - 133/63)  BP(mean): --  RR: 19 (09 May 2021 12:40) (18 - 19)  SpO2: 99% (08 May 2021 20:39) (99% - 99%)    PHYSICAL EXAM:  GENERAL: NAD, well-groomed, well-developed  HEAD:  Atraumatic, Normocephalic  EYES: EOMI, PERRLA, conjunctiva and sclera clear  ENMT: No tonsillar erythema, exudates, or enlargement; Moist mucous membranes, Good dentition, No lesions  NECK: Supple, No JVD, Normal thyroid  NERVOUS SYSTEM:  Alert & Oriented X3, Good concentration; Motor Strength 5/5 B/L upper and lower extremities; DTRs 2+ intact and symmetric  PULM: Clear to auscultation bilaterally  CARDIAC: Regular rate and rhythm; No murmurs, rubs, or gallops  GI: Soft, Nontender, Nondistended; Bowel sounds present  EXTREMITIES:  2+ Peripheral Pulses, No clubbing, cyanosis, or edema  LYMPH: No lymphadenopathy noted  SKIN: No rashes or lesions    Consultant(s) Notes Reviewed:  [x ] YES  [ ] NO  Care Discussed with Consultants/Other Providers [ x] YES  [ ] NO    LABS:                            12.7   7.94  )-----------( 261      ( 09 May 2021 04:30 )             38.5   05-09    141  |  106  |  14  ----------------------------<  91  4.1   |  24  |  0.7    Ca    9.2      09 May 2021 04:30  Phos  4.0     05-09  Mg     1.8     05-09    TPro  6.7  /  Alb  3.8  /  TBili  0.5  /  DBili  x   /  AST  15  /  ALT  13  /  AlkPhos  59  05-09            enoxaparin Injectable 40 milliGRAM(s) SubCutaneous daily      HEALTH ISSUES - PROBLEM Dx:          Case Discussed with House Staff   Spectra [phone]

## 2021-05-09 NOTE — CHART NOTE - NSCHARTNOTEFT_GEN_A_CORE
This is a late note for a patient who was discharged earlier today. I saw the patient in the morning.    <<<RESIDENT DISCHARGE NOTE>>>     JOSE R PERKINS  MRN-[id number]    VITAL SIGNS:  T(F): 97.5 (05-09-21 @ 12:40), Max: 97.5 (05-09-21 @ 12:40)  HR: 86 (05-09-21 @ 12:40)  BP: 116/77 (05-09-21 @ 12:40)  SpO2: --      PHYSICAL EXAMINATION:  GEN: NAD, Resting comfortably in bed  PULM: Clear to auscultation bilaterally, No wheezes  CVS: Regular rate and rhythm, S1-S2, no murmurs  ABD: Soft, non-tender, non-distended, no guarding  EXT: No edema  NEURO: AAOx3, no focal deficits    TEST RESULTS:                        12.7   7.94  )-----------( 261      ( 09 May 2021 04:30 )             38.5       05-09    141  |  106  |  14  ----------------------------<  91  4.1   |  24  |  0.7    Ca    9.2      09 May 2021 04:30  Phos  4.0     05-09  Mg     1.8     05-09    TPro  6.7  /  Alb  3.8  /  TBili  0.5  /  DBili  x   /  AST  15  /  ALT  13  /  AlkPhos  59  05-09      FINAL DISCHARGE INTERVIEW:  Resident(s) Present: Dr. Chun Ceja    DISCHARGE MEDICATION RECONCILIATION  reviewed with Attending (Name: ) Dr. Jacobo    DISPOSITION:   [ x ] Home,    [  ] Home with Visiting Nursing Services,   [  ]  SNF/ NH,    [  ] Acute Rehab (4A),   [  ] Other (Specify:_________)

## 2021-05-10 LAB
A1C WITH ESTIMATED AVERAGE GLUCOSE RESULT: 5.2 % — SIGNIFICANT CHANGE UP (ref 4–5.6)
COVID-19 SPIKE DOMAIN AB INTERP: POSITIVE
COVID-19 SPIKE DOMAIN ANTIBODY RESULT: >250 U/ML — HIGH
ESTIMATED AVERAGE GLUCOSE: 103 MG/DL — SIGNIFICANT CHANGE UP (ref 68–114)
SARS-COV-2 IGG+IGM SERPL QL IA: >250 U/ML — HIGH
SARS-COV-2 IGG+IGM SERPL QL IA: POSITIVE

## 2021-05-18 DIAGNOSIS — E05.00 THYROTOXICOSIS WITH DIFFUSE GOITER WITHOUT THYROTOXIC CRISIS OR STORM: ICD-10-CM

## 2021-05-18 DIAGNOSIS — R20.0 ANESTHESIA OF SKIN: ICD-10-CM

## 2021-05-18 DIAGNOSIS — Z88.0 ALLERGY STATUS TO PENICILLIN: ICD-10-CM

## 2021-05-18 DIAGNOSIS — E66.3 OVERWEIGHT: ICD-10-CM

## 2021-06-17 PROBLEM — E05.90 THYROTOXICOSIS, UNSPECIFIED WITHOUT THYROTOXIC CRISIS OR STORM: Chronic | Status: ACTIVE | Noted: 2021-05-08

## 2021-07-20 ENCOUNTER — TRANSCRIPTION ENCOUNTER (OUTPATIENT)
Age: 46
End: 2021-07-20

## 2021-07-20 ENCOUNTER — EMERGENCY (EMERGENCY)
Facility: HOSPITAL | Age: 46
LOS: 0 days | Discharge: HOME | End: 2021-07-20
Attending: EMERGENCY MEDICINE | Admitting: EMERGENCY MEDICINE
Payer: MEDICAID

## 2021-07-20 VITALS — HEART RATE: 98 BPM | DIASTOLIC BLOOD PRESSURE: 62 MMHG | SYSTOLIC BLOOD PRESSURE: 145 MMHG

## 2021-07-20 VITALS
DIASTOLIC BLOOD PRESSURE: 80 MMHG | TEMPERATURE: 98 F | WEIGHT: 164.91 LBS | RESPIRATION RATE: 18 BRPM | HEART RATE: 93 BPM | OXYGEN SATURATION: 99 % | SYSTOLIC BLOOD PRESSURE: 158 MMHG | HEIGHT: 64 IN

## 2021-07-20 DIAGNOSIS — Z88.0 ALLERGY STATUS TO PENICILLIN: ICD-10-CM

## 2021-07-20 DIAGNOSIS — R07.89 OTHER CHEST PAIN: ICD-10-CM

## 2021-07-20 DIAGNOSIS — R06.02 SHORTNESS OF BREATH: ICD-10-CM

## 2021-07-20 DIAGNOSIS — Z98.890 OTHER SPECIFIED POSTPROCEDURAL STATES: Chronic | ICD-10-CM

## 2021-07-20 DIAGNOSIS — Z90.49 ACQUIRED ABSENCE OF OTHER SPECIFIED PARTS OF DIGESTIVE TRACT: Chronic | ICD-10-CM

## 2021-07-20 LAB
ALBUMIN SERPL ELPH-MCNC: 4.1 G/DL — SIGNIFICANT CHANGE UP (ref 3.5–5.2)
ALP SERPL-CCNC: 58 U/L — SIGNIFICANT CHANGE UP (ref 30–115)
ALT FLD-CCNC: 10 U/L — SIGNIFICANT CHANGE UP (ref 0–41)
ANION GAP SERPL CALC-SCNC: 12 MMOL/L — SIGNIFICANT CHANGE UP (ref 7–14)
AST SERPL-CCNC: 22 U/L — SIGNIFICANT CHANGE UP (ref 0–41)
BASOPHILS # BLD AUTO: 0.05 K/UL — SIGNIFICANT CHANGE UP (ref 0–0.2)
BASOPHILS NFR BLD AUTO: 0.7 % — SIGNIFICANT CHANGE UP (ref 0–1)
BILIRUB SERPL-MCNC: 1 MG/DL — SIGNIFICANT CHANGE UP (ref 0.2–1.2)
BUN SERPL-MCNC: 10 MG/DL — SIGNIFICANT CHANGE UP (ref 10–20)
CALCIUM SERPL-MCNC: 9 MG/DL — SIGNIFICANT CHANGE UP (ref 8.5–10.1)
CHLORIDE SERPL-SCNC: 101 MMOL/L — SIGNIFICANT CHANGE UP (ref 98–110)
CO2 SERPL-SCNC: 23 MMOL/L — SIGNIFICANT CHANGE UP (ref 17–32)
CREAT SERPL-MCNC: 0.7 MG/DL — SIGNIFICANT CHANGE UP (ref 0.7–1.5)
EOSINOPHIL # BLD AUTO: 0.09 K/UL — SIGNIFICANT CHANGE UP (ref 0–0.7)
EOSINOPHIL NFR BLD AUTO: 1.3 % — SIGNIFICANT CHANGE UP (ref 0–8)
GLUCOSE SERPL-MCNC: 86 MG/DL — SIGNIFICANT CHANGE UP (ref 70–99)
HCG SERPL QL: NEGATIVE — SIGNIFICANT CHANGE UP
HCT VFR BLD CALC: 39.4 % — SIGNIFICANT CHANGE UP (ref 37–47)
HGB BLD-MCNC: 13.1 G/DL — SIGNIFICANT CHANGE UP (ref 12–16)
IMM GRANULOCYTES NFR BLD AUTO: 0.4 % — HIGH (ref 0.1–0.3)
LYMPHOCYTES # BLD AUTO: 2.28 K/UL — SIGNIFICANT CHANGE UP (ref 1.2–3.4)
LYMPHOCYTES # BLD AUTO: 33.8 % — SIGNIFICANT CHANGE UP (ref 20.5–51.1)
MCHC RBC-ENTMCNC: 29 PG — SIGNIFICANT CHANGE UP (ref 27–31)
MCHC RBC-ENTMCNC: 33.2 G/DL — SIGNIFICANT CHANGE UP (ref 32–37)
MCV RBC AUTO: 87.2 FL — SIGNIFICANT CHANGE UP (ref 81–99)
MONOCYTES # BLD AUTO: 0.66 K/UL — HIGH (ref 0.1–0.6)
MONOCYTES NFR BLD AUTO: 9.8 % — HIGH (ref 1.7–9.3)
NEUTROPHILS # BLD AUTO: 3.63 K/UL — SIGNIFICANT CHANGE UP (ref 1.4–6.5)
NEUTROPHILS NFR BLD AUTO: 54 % — SIGNIFICANT CHANGE UP (ref 42.2–75.2)
NRBC # BLD: 0 /100 WBCS — SIGNIFICANT CHANGE UP (ref 0–0)
PLATELET # BLD AUTO: 275 K/UL — SIGNIFICANT CHANGE UP (ref 130–400)
POTASSIUM SERPL-MCNC: 5 MMOL/L — SIGNIFICANT CHANGE UP (ref 3.5–5)
POTASSIUM SERPL-SCNC: 5 MMOL/L — SIGNIFICANT CHANGE UP (ref 3.5–5)
PROT SERPL-MCNC: 7.5 G/DL — SIGNIFICANT CHANGE UP (ref 6–8)
RBC # BLD: 4.52 M/UL — SIGNIFICANT CHANGE UP (ref 4.2–5.4)
RBC # FLD: 13 % — SIGNIFICANT CHANGE UP (ref 11.5–14.5)
SODIUM SERPL-SCNC: 136 MMOL/L — SIGNIFICANT CHANGE UP (ref 135–146)
TROPONIN T SERPL-MCNC: <0.01 NG/ML — SIGNIFICANT CHANGE UP
WBC # BLD: 6.74 K/UL — SIGNIFICANT CHANGE UP (ref 4.8–10.8)
WBC # FLD AUTO: 6.74 K/UL — SIGNIFICANT CHANGE UP (ref 4.8–10.8)

## 2021-07-20 PROCEDURE — 71046 X-RAY EXAM CHEST 2 VIEWS: CPT | Mod: 26

## 2021-07-20 PROCEDURE — 99285 EMERGENCY DEPT VISIT HI MDM: CPT

## 2021-07-20 PROCEDURE — 71275 CT ANGIOGRAPHY CHEST: CPT | Mod: 26,MA

## 2021-07-20 PROCEDURE — 93010 ELECTROCARDIOGRAM REPORT: CPT

## 2021-07-20 NOTE — ED PROVIDER NOTE - CARE PROVIDER_API CALL
Kanchan Barr  INTERNAL MEDICINE  [address] [place] [address]  Phone: [phone]  Fax: [phone]  Follow Up Time: 1-3 Days    Elizabeth Gee)  Cardiovascular Disease; Internal Medicine  [address] [place] [address]  Phone: [phone]  Fax: [phone]  Follow Up Time:

## 2021-07-20 NOTE — ED PROVIDER NOTE - PATIENT PORTAL LINK FT
You can access the FollowMyHealth Patient Portal offered by [facility] by registering at the following website: [email or web address]. By joining [employer]’s FollowMyHealth portal, you will also be able to view your health information using other applications (apps) compatible with our system.

## 2021-07-20 NOTE — ED PROVIDER NOTE - MDM ORDERS SUBMITTED SELECTION
[address] 
[phone] Patient: Sandra Moore MRN: [id number] QSA:[id number] Visit Information Date & Time Provider Department Dept. Phone Encounter #  
 10/8/2018  9:45 AM Elian Zarco, [address] [place] [address],[address] [phone] [id number] Follow-up Instructions Return in about 1 year (around 10/8/2019) for general exam . Upcoming Health Maintenance Date Due COLONOSCOPY 4/21/1977 Shingrix Vaccine Age 50> (1 of 2) 4/21/2009 BREAST CANCER SCRN MAMMOGRAM 4/21/2009 PAP AKA CERVICAL CYTOLOGY 10/8/2021 DTaP/Tdap/Td series (2 - Td) 5/30/2023 Allergies as of 10/8/2018  Review Complete On: 10/8/2018 By: Vera Brown Severity Noted Reaction Type Reactions Sulfa (Sulfonamide Antibiotics)  06/08/2018    Hives Current Immunizations  Reviewed on 10/8/2018 Name Date Hep B Vaccine 10/12/2017 Influenza Nasal Vaccine 9/20/2018 Tdap 5/30/2013 Zoster Vaccine, Live 5/30/2013 Reviewed by Elian Zarco MD on 10/8/2018 at 10:29 AM  
You Were Diagnosed With   
  
 Codes Comments Women's annual routine gynecological examination    -  Primary ICD-10-CM: H04.537 ICD-9-CM: V72.31 Screening for tuberculosis     ICD-10-CM: Z11.1 ICD-9-CM: V74.1 Need for hepatitis C screening test     ICD-10-CM: Z11.59 
ICD-9-CM: V73.89 Screening mammogram, encounter for     ICD-10-CM: Z12.31 
ICD-9-CM: V76.12 Vitals BP Pulse Temp Resp Height(growth percentile) Weight(growth percentile) (!) 150/92 (BP 1 Location: Right arm, BP Patient Position: Sitting) 67 97.8 °F (36.6 °C) (Oral) 18 5' 3\" (1.6 m) 134 lb (60.8 kg) SpO2 BMI OB Status Smoking Status 99% 23.74 kg/m2 Hysterectomy Never Smoker BMI and BSA Data Body Mass Index Body Surface Area  
 23.74 kg/m 2 1.64 m 2 Preferred Pharmacy Pharmacy Name Phone [id number] [facility] [phone] Your Updated Medication List  
  
   
This list is accurate as of 10/8/18 10:30 AM.  Always use your most recent med list.  
  
  
  
  
 CELLCEPT 500 mg tablet Generic drug:  mycophenolate Take 500 mg by mouth two (2) times a day. COSENTYX  mg/mL Pnij Generic drug:  secukinumab  
by SubCUTAneous route. estradiol 0.0375 mg/24 hr  
Commonly known as:  VIVELLE  
1 Patch by TransDERmal route two (2) times a week.  
  
 gabapentin 300 mg capsule Commonly known as:  NEURONTIN Take 300 mg by mouth three (3) times daily. metroNIDAZOLE 0.75 % topical cream  
Commonly known as:  METROCREAM  
Apply  to affected area two (2) times a day. Use a thin layer to affected areas after washing PROTONIX 40 mg tablet Generic drug:  pantoprazole Take 40 mg by mouth daily. We Performed the Following HEPATITIS C AB [44119 CPT(R)] PAP IG, APTIMA HPV AND RFX 16/18,45 (950462) [[id number] Custom] QUANTIFERON-TB GOLD PLUS [id number] Custom] Follow-up Instructions Return in about 1 year (around 10/8/2019) for general exam . To-Do List   
 10/08/2018 Imaging:  [facility] MAMMO BI SCREENING INCL CAD Patient Instructions We are doing quantiferon gold - blood test for TB - will notify you of results Introducing [facility]! Dear Betsy Kendall: Thank you for requesting a [employer] account. Our records indicate that you already have an active [employer] account. You can access your account anytime at [email or web address]. [employer] Did you know that you can access your hospital and ER discharge instructions at any time in [employer]? You can also review all of your test results from your hospital stay or ER visit. Additional Information If you have questions, please visit the Frequently Asked Questions section of the [employer] website at [email or web address] Remember, [employer] is NOT to be used for urgent needs. For medical emergencies, dial 911. Now available from your iPhone and Android! Please provide this summary of care documentation to your next provider. Your primary care clinician is listed as MARCELO Hunt. If you have any questions after today's visit, please call [phone]. Labs/Imaging Studies

## 2021-07-20 NOTE — ED ADULT TRIAGE NOTE - CHIEF COMPLAINT QUOTE
pt c/o sob with some chest tightness since sunday, [facility], d dimer+, send in for ct scan

## 2021-07-20 NOTE — ED PROVIDER NOTE - OBJECTIVE STATEMENT
46 y/o F, no significant PMHx, presents to the ED with complaints of chest discomfort x four days. She admits to intermittent sub-sternal chest discomfort without associated dyspnea, lower extremity swelling, fever, chills, back pain, abdominal pain, recent travel 46 y/o F, no significant PMHx, presents to the ED with complaints of chest discomfort x four days. She admits to intermittent sub-sternal chest discomfort without associated dyspnea, lower extremity swelling, fever, chills, back pain, abdominal pain, recent travel/immobilization and prior hx of DVT/PE. She is not a smoker; denies immediate FHx of CAD. She went to [facility] yesterday and received a call today advising her that her d-dimer was elevation and to proceed to the ED for further evaluation.

## 2021-07-20 NOTE — ED PROVIDER NOTE - NSFOLLOWUPINSTRUCTIONS_ED_ALL_ED_FT

## 2021-07-20 NOTE — ED PROVIDER NOTE - CLINICAL SUMMARY MEDICAL DECISION MAKING FREE TEXT BOX
44 yo female  PMH hyperthyroidism, not requiring medications, had COVID-19 in the spring of last year with chronic residual chest tightness and exertional SOB sent for evaluation of elevated d-dimer,  Patient went to  clinic yesterday for evaluation, labs were sent and she was called today with a reports of abnormal d/dimer.  She denies any new /acute complaints such as pain, change in exercise tolerance, leg pain or swelling, no N/V/abdominal pain, no dizziness or lightheadedness, no black or bloody stools.  . There are no known risk factors for PE, no family h/o early CAD or sudden cardiac death.   Well-appearing well-nourished young female in NAD, head AT/NC, PERRL, pink conjunctivae,  mmm, nml oropharynx, nml phonation without drooling or trismus, supple neck without midline spine ttp, nml work of breathing, lungs CTA b/l, equal air entry, RRR, well-perfused extremities, distal pulses intact, abdomen soft, NT/ND, BS present in all quadrants, no midline spine or CVA ttp, no leg edema or unilateral calf swelling, A&Ox3, no focal neuro deficits, nml mood and affect.  CTA chest is negative for PE, CXR OK, ECG without ischemic changes.  Symptoms are chronic as per patient, she is stable for d/.c home, advised to follow up with her PMD, strict return precautions given.

## 2021-07-20 NOTE — ED PROVIDER NOTE - CARE PROVIDERS DIRECT ADDRESSES
[email or web address]

## 2021-08-10 PROBLEM — Z00.00 ENCOUNTER FOR PREVENTIVE HEALTH EXAMINATION: Status: ACTIVE | Noted: 2021-08-10

## 2021-10-07 ENCOUNTER — APPOINTMENT (OUTPATIENT)
Dept: OBGYN | Facility: CLINIC | Age: 46
End: 2021-10-07
Payer: MEDICAID

## 2021-10-07 ENCOUNTER — NON-APPOINTMENT (OUTPATIENT)
Age: 46
End: 2021-10-07

## 2021-10-07 VITALS
BODY MASS INDEX: 28.16 KG/M2 | WEIGHT: 167 LBS | SYSTOLIC BLOOD PRESSURE: 125 MMHG | HEART RATE: 104 BPM | TEMPERATURE: 97.3 F | HEIGHT: 64.5 IN | DIASTOLIC BLOOD PRESSURE: 87 MMHG

## 2021-10-07 DIAGNOSIS — Z01.419 ENCOUNTER FOR GYNECOLOGICAL EXAMINATION (GENERAL) (ROUTINE) W/OUT ABNORMAL FINDINGS: ICD-10-CM

## 2021-10-07 PROCEDURE — 99396 PREV VISIT EST AGE 40-64: CPT

## 2021-10-07 NOTE — HISTORY OF PRESENT ILLNESS
[FreeTextEntry1] : feels well\par had hysterectomy for adenomyosis and endometriosis 2.5 years ago\par feels well

## 2021-10-15 LAB
C TRACH RRNA SPEC QL NAA+PROBE: NOT DETECTED
CYTOLOGY CVX/VAG DOC THIN PREP: ABNORMAL
HPV HIGH+LOW RISK DNA PNL CVX: NOT DETECTED
N GONORRHOEA RRNA SPEC QL NAA+PROBE: NOT DETECTED
SOURCE AMPLIFICATION: NORMAL

## 2021-11-01 DIAGNOSIS — T36.95XA CANDIDIASIS, UNSPECIFIED: ICD-10-CM

## 2021-11-01 DIAGNOSIS — B37.9 CANDIDIASIS, UNSPECIFIED: ICD-10-CM

## 2021-11-02 ENCOUNTER — NON-APPOINTMENT (OUTPATIENT)
Age: 46
End: 2021-11-02

## 2021-11-02 RX ORDER — FLUCONAZOLE 150 MG/1
150 TABLET ORAL DAILY
Qty: 1 | Refills: 1 | Status: ACTIVE | COMMUNITY
Start: 2021-11-01 | End: 1900-01-01

## 2022-06-21 NOTE — ED ADULT NURSE NOTE - NS_ED_NURSE_TEACHING_TOPIC_ED_A_ED
Enbrel Counseling:  I discussed with the patient the risks of etanercept including but not limited to myelosuppression, immunosuppression, autoimmune hepatitis, demyelinating diseases, lymphoma, and infections.  The patient understands that monitoring is required including a PPD at baseline and must alert us or the primary physician if symptoms of infection or other concerning signs are noted. FOLLOW UP WITH PMD

## 2022-08-30 NOTE — PATIENT PROFILE ADULT - NSPRONUTRITIONRISK_GEN_A_NUR
No indicators present
Bleeding that does not stop/Swelling that gets worse/Pain not relieved by Medications/Fever greater than (need to indicate Fahrenheit or Celsius)/Wound/Surgical Site with redness, or foul smelling discharge or pus/Numbness, tingling, color or temperature change to extremity/Nausea and vomiting that does not stop/Increased irritability or sluggishness

## 2024-04-23 NOTE — SWALLOW BEDSIDE ASSESSMENT ADULT - MODE OF PRESENTATION
Pt alert/awake with mom at cartside. Discharge, medications, and follow up reviewed. Mom verbalized understanding. Pt josue  PO. Pt breathing easy at this time. No questions at this time. Ok to dc per ER MD/NP.   self fed cup/self fed

## 2024-06-10 NOTE — ED ADULT NURSE NOTE - CAS EDP DISCH TYPE

## 2025-01-04 ENCOUNTER — EMERGENCY (EMERGENCY)
Facility: HOSPITAL | Age: 50
LOS: 0 days | Discharge: HOME | End: 2025-01-04
Attending: STUDENT IN AN ORGANIZED HEALTH CARE EDUCATION/TRAINING PROGRAM
Payer: COMMERCIAL

## 2025-01-04 VITALS
WEIGHT: 160.06 LBS | OXYGEN SATURATION: 96 % | SYSTOLIC BLOOD PRESSURE: 161 MMHG | DIASTOLIC BLOOD PRESSURE: 96 MMHG | RESPIRATION RATE: 18 BRPM | HEART RATE: 90 BPM | TEMPERATURE: 98 F

## 2025-01-04 DIAGNOSIS — Z98.890 OTHER SPECIFIED POSTPROCEDURAL STATES: Chronic | ICD-10-CM

## 2025-01-04 DIAGNOSIS — R07.89 OTHER CHEST PAIN: ICD-10-CM

## 2025-01-04 DIAGNOSIS — Z90.49 ACQUIRED ABSENCE OF OTHER SPECIFIED PARTS OF DIGESTIVE TRACT: Chronic | ICD-10-CM

## 2025-01-04 DIAGNOSIS — R06.02 SHORTNESS OF BREATH: ICD-10-CM

## 2025-01-04 DIAGNOSIS — Z88.0 ALLERGY STATUS TO PENICILLIN: ICD-10-CM

## 2025-01-04 LAB
ALBUMIN SERPL ELPH-MCNC: 3.9 G/DL — SIGNIFICANT CHANGE UP (ref 3.5–5.2)
ALP SERPL-CCNC: 78 U/L — SIGNIFICANT CHANGE UP (ref 30–115)
ALT FLD-CCNC: 13 U/L — SIGNIFICANT CHANGE UP (ref 0–41)
ANION GAP SERPL CALC-SCNC: 9 MMOL/L — SIGNIFICANT CHANGE UP (ref 7–14)
AST SERPL-CCNC: 13 U/L — SIGNIFICANT CHANGE UP (ref 0–41)
BASOPHILS # BLD AUTO: 0 K/UL — SIGNIFICANT CHANGE UP (ref 0–0.2)
BASOPHILS NFR BLD AUTO: 0 % — SIGNIFICANT CHANGE UP (ref 0–1)
BILIRUB SERPL-MCNC: 0.5 MG/DL — SIGNIFICANT CHANGE UP (ref 0.2–1.2)
BUN SERPL-MCNC: 10 MG/DL — SIGNIFICANT CHANGE UP (ref 10–20)
CALCIUM SERPL-MCNC: 9.5 MG/DL — SIGNIFICANT CHANGE UP (ref 8.4–10.5)
CHLORIDE SERPL-SCNC: 98 MMOL/L — SIGNIFICANT CHANGE UP (ref 98–110)
CO2 SERPL-SCNC: 27 MMOL/L — SIGNIFICANT CHANGE UP (ref 17–32)
CREAT SERPL-MCNC: 0.7 MG/DL — SIGNIFICANT CHANGE UP (ref 0.7–1.5)
EGFR: 107 ML/MIN/1.73M2 — SIGNIFICANT CHANGE UP
EGFR: 107 ML/MIN/1.73M2 — SIGNIFICANT CHANGE UP
EOSINOPHIL # BLD AUTO: 0 K/UL — SIGNIFICANT CHANGE UP (ref 0–0.7)
EOSINOPHIL NFR BLD AUTO: 0 % — SIGNIFICANT CHANGE UP (ref 0–8)
GIANT PLATELETS BLD QL SMEAR: PRESENT — SIGNIFICANT CHANGE UP
GLUCOSE SERPL-MCNC: 99 MG/DL — SIGNIFICANT CHANGE UP (ref 70–99)
HCG SERPL QL: NEGATIVE — SIGNIFICANT CHANGE UP
HCT VFR BLD CALC: 40.8 % — SIGNIFICANT CHANGE UP (ref 37–47)
HGB BLD-MCNC: 13.8 G/DL — SIGNIFICANT CHANGE UP (ref 12–16)
LIDOCAIN IGE QN: 34 U/L — SIGNIFICANT CHANGE UP (ref 7–60)
LYMPHOCYTES # BLD AUTO: 25.7 % — SIGNIFICANT CHANGE UP (ref 20.5–51.1)
LYMPHOCYTES # BLD AUTO: 3.78 K/UL — HIGH (ref 1.2–3.4)
MAGNESIUM SERPL-MCNC: 1.8 MG/DL — SIGNIFICANT CHANGE UP (ref 1.8–2.4)
MANUAL SMEAR VERIFICATION: SIGNIFICANT CHANGE UP
MCHC RBC-ENTMCNC: 29.8 PG — SIGNIFICANT CHANGE UP (ref 27–31)
MCHC RBC-ENTMCNC: 33.8 G/DL — SIGNIFICANT CHANGE UP (ref 32–37)
MCV RBC AUTO: 88.1 FL — SIGNIFICANT CHANGE UP (ref 81–99)
MONOCYTES # BLD AUTO: 2.71 K/UL — HIGH (ref 0.1–0.6)
MONOCYTES NFR BLD AUTO: 18.4 % — HIGH (ref 1.7–9.3)
NEUTROPHILS # BLD AUTO: 7.28 K/UL — HIGH (ref 1.4–6.5)
NEUTROPHILS NFR BLD AUTO: 49.5 % — SIGNIFICANT CHANGE UP (ref 42.2–75.2)
PLAT MORPH BLD: NORMAL — SIGNIFICANT CHANGE UP
PLATELET # BLD AUTO: 307 K/UL — SIGNIFICANT CHANGE UP (ref 130–400)
PMV BLD: 10.2 FL — SIGNIFICANT CHANGE UP (ref 7.4–10.4)
POTASSIUM SERPL-MCNC: 4.2 MMOL/L — SIGNIFICANT CHANGE UP (ref 3.5–5)
POTASSIUM SERPL-SCNC: 4.2 MMOL/L — SIGNIFICANT CHANGE UP (ref 3.5–5)
PROT SERPL-MCNC: 7.6 G/DL — SIGNIFICANT CHANGE UP (ref 6–8)
RBC # BLD: 4.63 M/UL — SIGNIFICANT CHANGE UP (ref 4.2–5.4)
RBC # FLD: 12.6 % — SIGNIFICANT CHANGE UP (ref 11.5–14.5)
RBC BLD AUTO: ABNORMAL
SMUDGE CELLS # BLD: PRESENT — SIGNIFICANT CHANGE UP
SODIUM SERPL-SCNC: 134 MMOL/L — LOW (ref 135–146)
TROPONIN T, HIGH SENSITIVITY RESULT: <6 NG/L — SIGNIFICANT CHANGE UP (ref 6–13)
VARIANT LYMPHS # BLD: 6.4 % — HIGH (ref 0–5)
VARIANT LYMPHS NFR BLD MANUAL: 6.4 % — HIGH (ref 0–5)
WBC # BLD: 14.71 K/UL — HIGH (ref 4.8–10.8)
WBC # FLD AUTO: 14.71 K/UL — HIGH (ref 4.8–10.8)